# Patient Record
(demographics unavailable — no encounter records)

---

## 2025-01-01 NOTE — HISTORY OF PRESENT ILLNESS
[de-identified] : 12/23/2024: The patient is a 26-year-old M, RHD who presents today complaining of left hip. 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 This IS a Work Related Injury being treated under Worker's Compensation. Quality of symptoms: Constant aching pain radiating to back  Improves with:  Pain reliver medication  Worse with:  Walking and bending  Prior treatment: [facility]  Prior imaging: XR @ ER Previous injury: None Out of work/sport: Out of work since [employer] School/Sport/Position/Occupation: Construction  Additional Information: [None]   [id number]  Shelly

## 2025-01-01 NOTE — DISCUSSION/SUMMARY
[de-identified] : The patient was advised of the diagnosis of Left Ilium Fracture. The natural history of the pathology was explained to the patient in layman's terms. Several different treatment options were discussed and explained including the risks and benefits of both surgical and non-surgical treatments. All questions and concerns were answered.  Patient sustained a communited left ilium and acetabular fracture 6/12/24.He had orif surgery at [facility]. Advised he needs to see his surgeon for further evaluation and treatment.

## 2025-01-01 NOTE — DISCUSSION/SUMMARY
[de-identified] : The patient was advised of the diagnosis of Left Ilium Fracture. The natural history of the pathology was explained to the patient in layman's terms. Several different treatment options were discussed and explained including the risks and benefits of both surgical and non-surgical treatments. All questions and concerns were answered.  Patient sustained a communited left ilium and acetabular fracture 6/12/24.He had orif surgery at [facility]. Advised he needs to see his surgeon for further evaluation and treatment.

## 2025-01-01 NOTE — PHYSICAL EXAM
[2+] : posterior tibialis pulse: 2+ [] : ambulation with cane [Left] : left hip with pelvis [No loss of surgical correlation. Bony alignment acceptable. Hardware in appropriate position] : No loss of surgical correlation. Bony alignment acceptable. Hardware in appropriate position

## 2025-01-01 NOTE — HISTORY OF PRESENT ILLNESS
[de-identified] : 12/23/2024: The patient is a 26-year-old M, RHD who presents today complaining of left hip. 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 This IS a Work Related Injury being treated under Worker's Compensation. Quality of symptoms: Constant aching pain radiating to back  Improves with:  Pain reliver medication  Worse with:  Walking and bending  Prior treatment: [facility]  Prior imaging: XR @ ER Previous injury: None Out of work/sport: Out of work since [employer] School/Sport/Position/Occupation: Construction  Additional Information: [None]   [id number]  Shelly